# Patient Record
Sex: MALE | Race: WHITE
[De-identification: names, ages, dates, MRNs, and addresses within clinical notes are randomized per-mention and may not be internally consistent; named-entity substitution may affect disease eponyms.]

---

## 2021-10-06 ENCOUNTER — HOSPITAL ENCOUNTER (EMERGENCY)
Dept: HOSPITAL 60 - LB.ED | Age: 41
Discharge: HOME | End: 2021-10-06
Payer: COMMERCIAL

## 2021-10-06 DIAGNOSIS — M54.42: Primary | ICD-10-CM

## 2021-10-06 NOTE — EDM.PDOC
ED HPI GENERAL MEDICAL PROBLEM





- General


Chief Complaint: Back Pain or Injury


Stated Complaint: back pain


Time Seen by Provider: 10/06/21 19:30


Source of Information: Reports: Patient


History Limitations: Reports: No Limitations





- History of Present Illness


INITIAL COMMENTS - FREE TEXT/NARRATIVE: 





This patient presents to the emergency department for evaluation of back pain.  

He states the back pain started on Monday while he was at work.  He denies any 

specific injury, trauma, or other activity that precipitated the pain; however, 

it has continued to get worse throughout the week.  Today he noticed it moving 

down his left leg through his buttock.  He denies any numbness or tingling in 

his hands and his feet.  He denies any incontinence of bowel or bladder.  He has

not taken any medications or tried any remedies for this.





- Related Data


                                    Allergies











Allergy/AdvReac Type Severity Reaction Status Date / Time


 


No Known Allergies Allergy   Verified 10/06/21 19:40











Home Meds: 


                                    Home Meds





NK [No Known Home Meds]  02/08/15 [History]











ED ROS GENERAL





- Review of Systems


Review Of Systems: Comprehensive ROS is negative, except as noted in HPI.





ED EXAM,LOWER BACK PAIN/INJURY





- Physical Exam


Exam: See Below


Exam Limited By: No Limitations


General Appearance: Alert, WD/WN, No Apparent Distress


Ears: Normal External Exam


Nose: Normal Inspection


Head: Atraumatic, Normocephalic


Neck: Normal Inspection, Full Range of Motion


Respiratory/Chest: No Respiratory Distress, No Accessory Muscle Use


Back Exam: Normal Inspection, Decreased Range of Motion (Related to pain), Dhiraj

salome Tenderness (Lumbar 3-5; pain radiates through buttock on left down leg to 

about the knee.)


Extremities: Normal Capillary Refill


Neurological: Alert


Psychiatric: Normal Affect


Skin Exam: Warm, Dry, Intact





Course





- Re-Assessments/Exams


Free Text/Narrative Re-Assessment/Exam: 





10/06/21 19:49


This patient presents to the emergency department for evaluation of back pain 

and radicular symptoms.  He does have a history of back pain in the past.  He 

did not sustain any trauma therefore x-rays are not necessary due to the low 

likelihood of fracture or subluxation.  Advanced imaging with CT or MRI is not 

indicated at this time but may be indicated in the future if symptoms fail to 

resolve.  There is no indication for consultation with neurosurgery or 

orthopedic spinal surgeon.  Patient has not had a fever, saddle or perineal 

anesthesia, bilateral foot numbness or bowel or bladder dysfunction.  There is 

no clinical evidence of cauda equina syndrome, discitis, spinal or epidural 

space hematoma or epidural abscess.  Neurologic exam is grossly normal with 

exception of some dermal tunnel symptoms noted here.  Patient was advised that 

this will take some time to resolve and he should be using a nonsteroidal 

anti-inflammatory to assist with symptoms along with supportive care including 

heat or ice, gentle stretching, and rest.  He should follow-up with his primary 

care provider in 3 to 5 days if he is not better, sooner if he is worse in any 

way.  He was cautioned avoid heavy lifting, bending or twisting.  The patient 

was stable at the time of discharge.





Departure





- Departure


Time of Disposition: 19:45


Disposition: Home, Self-Care 01


Condition: Good


Clinical Impression: 


 Lower back pain, Sciatica of left side








- Discharge Information


*PRESCRIPTION DRUG MONITORING PROGRAM REVIEWED*: No


*COPY OF PRESCRIPTION DRUG MONITORING REPORT IN PATIENT DAVID: No


Instructions:  Sciatica


Forms:  ED Department Discharge


Additional Instructions: 


Take Aleve/Naprasyn over the counter medication 2 tabs twice a day for 5 days. 

May apply heat or ice packs as needed for pain, which ever relieves pain. Return

 to primary provider or ED if pain does not improve or increases.

## 2021-10-20 ENCOUNTER — HOSPITAL ENCOUNTER (EMERGENCY)
Dept: HOSPITAL 60 - LB.ED | Age: 41
Discharge: HOME | End: 2021-10-20
Payer: COMMERCIAL

## 2021-10-20 VITALS — DIASTOLIC BLOOD PRESSURE: 90 MMHG | HEART RATE: 75 BPM | SYSTOLIC BLOOD PRESSURE: 140 MMHG

## 2021-10-20 DIAGNOSIS — S61.412A: Primary | ICD-10-CM
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
## 2021-10-20 NOTE — EDM.PDOC
ED HPI GENERAL MEDICAL PROBLEM





- General


Chief Complaint: Laceration


Stated Complaint: laceration


Time Seen by Provider: 10/20/21 13:29





- History of Present Illness


INITIAL COMMENTS - FREE TEXT/NARRATIVE: 





Pt comes in with a laceration to his left hand.


He was cutting insulation when he cut himself.


He held pressure to the area to help with bleeding.


He is not on any blood thinners.


Last Tetanus bart 3 yrs ago.


  ** Left Hand


Pain Score (Numeric/FACES): 2





- Related Data


                                    Allergies











Allergy/AdvReac Type Severity Reaction Status Date / Time


 


No Known Allergies Allergy   Verified 10/20/21 13:31











Home Meds: 


                                    Home Meds





NK [No Known Home Meds]  02/08/15 [History]











Past Medical History





- Past Health History


Medical/Surgical History: Denies Medical/Surgical History





Social & Family History





- Recreational Drug Use


Recreational Drug Use: No





ED ROS GENERAL





- Review of Systems


Review Of Systems: Comprehensive ROS is negative, except as noted in HPI.


Musculoskeletal: Reports: Other (laceration to left hand.)





ED EXAM, SKIN/RASH


Exam: See Below


Extremities: Other (examining his left hand reveal a laceration from the base of

 the index finger to the base of the thumb. clean edges. 4 cm long. no current 

bleeding.)





ED SKIN PROCEDURES





- Laceration/Wound Repair


  ** Left Dorsal Hand


Appearance: Other (Just thru the epidermis.)


Distal NVT: Neuro & Vascular Intact, No Tendon Injury


Anesthetic Type: Local


Local Anesthesia - Lidocaine (Xylocaine): 1% with EPI


Local Anesthetic Volume: 3cc


Skin Prep: Providone-Iodine (Betadine)


Closed with: Sutures


Lac/Wound length In cm: 4


Suture Size: 5-0


# of Sutures: 6





Course





- Vital Signs


Last Recorded V/S: 





                                Last Vital Signs











Temp  97 F   10/20/21 13:22


 


Pulse  75   10/20/21 13:22


 


Resp  18   10/20/21 13:22


 


BP  140/90   10/20/21 13:22


 


Pulse Ox  97   10/20/21 13:22














- Re-Assessments/Exams


Free Text/Narrative Re-Assessment/Exam: 





10/20/21 13:50


Pt discharged home.


Monitor for infection. Keep laceration covered for 2-3 days, and clean.


Tylenol or Motrin as needed for pain.


Light duty activity as tolerated.


Suture removal in 8 - 10 days.





Departure





- Departure


Time of Disposition: 13:45


Disposition: Home, Self-Care 01


Condition: Good


Clinical Impression: 


Laceration of hand, left


Qualifiers:


 Encounter type: initial encounter Foreign body presence: without foreign body 

Qualified Code(s): S61.412A - Laceration without foreign body of left hand, 

initial encounter








- Discharge Information


*PRESCRIPTION DRUG MONITORING PROGRAM REVIEWED*: No


*COPY OF PRESCRIPTION DRUG MONITORING REPORT IN PATIENT DAVID: No


Instructions:  Laceration Care, Adult


Referrals: 


PCP,None [Primary Care Provider] - 


Forms:  ED Department Discharge


Care Plan Goals: 


stitches out in 8-10 days.  Keep clean.





Sepsis Event Note (ED)





- Evaluation


Sepsis Screening Result: No Definite Risk





- Focused Exam


Vital Signs: 





                                   Vital Signs











  Temp Pulse Resp BP Pulse Ox


 


 10/20/21 13:22  97 F  75  18  140/90  97